# Patient Record
(demographics unavailable — no encounter records)

---

## 2025-07-08 NOTE — QUALITY MEASURES
[Patient has reason to be excluded] : patient has reason to be excluded from screening for tobacco use (eg: limited life expectancy, other medical reason) [Patient unable to perform] : patient is unable to perform spirometry

## 2025-07-08 NOTE — HISTORY OF PRESENT ILLNESS
[Never] : never [TextBox_4] : CHIEF COMPLAINT: Cough History of asthma and postnasal drip PATIENT SUMMARY:  The patient presented with a Chronic dry cough for last 4 years  HISTORY OF PRESENT ILLNESS:  The patient reported a progressive cough that began approximately four to five years ago, typically appearing at the end of summer. Initially, the cough lasted only a couple of weeks and responded to allergy medication, believed to be for asthma. Over the years, the cough's duration increased, starting earlier in the year and persisting for longer periods. This year, the cough began in March or April and had not resolved by the time of the appointment. The patient described the cough as minimally productive, with occasional phlegm that is whitish or slightly greenish, particularly in the morning. Medications such as Singulair and Albuterol provided some relief, but the patient expressed reluctance to rely on medication. The patient suspected a post-nasal drip or globus sensation as potential causes. The patient had a history of exposure to secondhand smoke from parents in childhood and worked in a nursing home.  There are no chest x-rays to compare. There are no prior pulmonary function test to compare.  PAST MEDICAL HISTORY: The patient had a history of allergies since childhood and received monthly allergy injections.  PAST SURGICAL HISTORY: Reviewed  MEDICATIONS:  - Singulair (asthma/allergy medication) - Albuterol inhaler  ALLERGIES:  - Codeine (causes hives) - Penicillin (causes hives)  SOCIAL HISTORY:  - Smoking: Never smoked; no use of e-cigarettes. - Alcohol Use: Consumes approximately one glass of wine per month. 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  FAMILY HISTORY:  Reviewed  REVIEW OF SYSTEMS:  Respiratory: Positive for a progressive cough lasting several months, minimally productive with occasional whitish or greenish phlegm. Negative for shortness of breath, wheezing, or chest pain. ENT: Positive for a sensation of needing to clear the throat, possibly due to post-nasal drip. Negative for nasal congestion or sinus discomfort.

## 2025-07-08 NOTE — REVIEW OF SYSTEMS
[Negative] : Endocrine [Fever] : no fever [Fatigue] : no fatigue [Recent Wt Gain (___ Lbs)] : ~T no recent weight gain [Poor Appetite] : no poor appetite [Recent Wt Loss (___ Lbs)] : ~T no recent weight loss [Dry Eyes] : no dry eyes [Ear Disturbance] : no ear disturbance [Epistaxis] : no epistaxis [Sore Throat] : no sore throat [Eye Irritation] : no eye irritation [Nasal Congestion] : nasal congestion [Postnasal Drip] : postnasal drip [Dry Mouth] : dry mouth [Sinus Problems] : sinus problems [Mouth Ulcers] : no mouth ulcers [Poor Dentition] : no poor dentition [Edentulous] : no edentulous [Cough] : cough [Hemoptysis] : no hemoptysis [Chest Tightness] : no chest tightness [Frequent URIs] : no frequent URIs [Sputum] : no sputum [Dyspnea] : no dyspnea [Pleuritic Pain] : no pleuritic pain [Wheezing] : wheezing [A.M. Dry Mouth] : a.m. dry mouth [SOB on Exertion] : no sob on exertion

## 2025-07-08 NOTE — PHYSICAL EXAM
[No Acute Distress] : no acute distress [Well Nourished] : well nourished [Well Developed] : well developed [Normal Oropharynx] : normal oropharynx [Erythema] : erythema [II] : Mallampati Class: II [Normal Appearance] : normal appearance [Supple] : supple [No Neck Mass] : no neck mass [No JVD] : no jvd [Normal Rate/Rhythm] : normal rate/rhythm [Normal S1, S2] : normal s1, s2 [No Murmurs] : no murmurs [No Resp Distress] : no resp distress [No Acc Muscle Use] : no acc muscle use [Clear to Auscultation Bilaterally] : clear to auscultation bilaterally [Normal Gait] : normal gait [No Clubbing] : no clubbing [No Cyanosis] : no cyanosis [No Edema] : no edema [Normal Color/ Pigmentation] : normal color/ pigmentation [No Rash] : no rash [No Focal Deficits] : no focal deficits [Oriented x3] : oriented x3 [Normal Mood] : normal mood [Normal Affect] : normal affect [Remote Memory Normal] : remote memory normal